# Patient Record
Sex: MALE | Race: WHITE | NOT HISPANIC OR LATINO | Employment: FULL TIME | URBAN - METROPOLITAN AREA
[De-identification: names, ages, dates, MRNs, and addresses within clinical notes are randomized per-mention and may not be internally consistent; named-entity substitution may affect disease eponyms.]

---

## 2017-05-06 ENCOUNTER — OFFICE VISIT (OUTPATIENT)
Dept: URGENT CARE | Facility: CLINIC | Age: 32
End: 2017-05-06

## 2017-05-06 VITALS
RESPIRATION RATE: 16 BRPM | WEIGHT: 149 LBS | BODY MASS INDEX: 20.79 KG/M2 | SYSTOLIC BLOOD PRESSURE: 150 MMHG | TEMPERATURE: 98.1 F | DIASTOLIC BLOOD PRESSURE: 80 MMHG | OXYGEN SATURATION: 97 % | HEART RATE: 80 BPM

## 2017-05-06 DIAGNOSIS — S01.01XA LACERATION OF SCALP WITHOUT FOREIGN BODY, INITIAL ENCOUNTER: ICD-10-CM

## 2017-05-06 PROCEDURE — 12001 RPR S/N/AX/GEN/TRNK 2.5CM/<: CPT | Performed by: PHYSICIAN ASSISTANT

## 2017-05-06 RX ORDER — CLINDAMYCIN HYDROCHLORIDE 300 MG/1
300 CAPSULE ORAL 3 TIMES DAILY
Qty: 15 CAP | Refills: 0 | Status: SHIPPED | OUTPATIENT
Start: 2017-05-06 | End: 2017-05-11

## 2017-05-06 ASSESSMENT — ENCOUNTER SYMPTOMS
CONSTITUTIONAL NEGATIVE: 1
NEUROLOGICAL NEGATIVE: 1

## 2017-05-06 NOTE — PROGRESS NOTES
Subjective:      Spencer Ruby is a 31 y.o. male who presents with Laceration            Laceration   The incident occurred 12 to 24 hours ago (L eyebrow area; superglued last night). The laceration is located on the left eye. The laceration is 2 cm in size. The laceration mechanism was a blunt object. The patient is experiencing no pain. The pain has been constant since onset. He reports no foreign bodies present. His tetanus status is unknown.       Review of Systems   Constitutional: Negative.    HENT: Negative.    Skin: Negative.    Neurological: Negative.           Objective:     /80 mmHg  Pulse 80  Temp(Src) 36.7 °C (98.1 °F)  Resp 16  Wt 67.586 kg (149 lb)  SpO2 97%     Physical Exam   Constitutional: He is oriented to person, place, and time. He appears well-developed and well-nourished. No distress.   HENT:   Head: Normocephalic.   L lat eyebrow area lac, stellate,   Proc= removed superglue, scrubbed w/betadine, irrig w/saline, steri strips w/good closure. rw   Eyes: EOM are normal. Pupils are equal, round, and reactive to light.   Neurological: He is alert and oriented to person, place, and time. No cranial nerve deficit. He exhibits normal muscle tone. Coordination normal.   Skin: Skin is warm and dry.   Psychiatric: He has a normal mood and affect. His behavior is normal. Judgment and thought content normal.   Nursing note and vitals reviewed.    Filed Vitals:    05/06/17 1026   BP: 150/80   Pulse: 80   Temp: 36.7 °C (98.1 °F)   Resp: 16   Weight: 67.586 kg (149 lb)   SpO2: 97%     Active Ambulatory Problems     Diagnosis Date Noted   • No Active Ambulatory Problems     Resolved Ambulatory Problems     Diagnosis Date Noted   • No Resolved Ambulatory Problems     Past Medical History   Diagnosis Date   • Asthma    • Chronic diarrhea      No current outpatient prescriptions on file prior to visit.     No current facility-administered medications on file prior to visit.     Gargles, Cepacol  lozenges, Aleve/Advil as needed for throat pain  Family History   Problem Relation Age of Onset   • Heart Disease Maternal Grandmother      Review of patient's allergies indicates no known allergies.              Assessment/Plan:     ·  L head lac      ·

## 2017-05-06 NOTE — MR AVS SNAPSHOT
Spencer Ruby   2017 10:15 AM   Office Visit   MRN: [id number]    Department:  [facility]   Dept Phone:  [phone]    Description:  Male : 1985   Provider:  Gurpreet Griffiths PA-C           Reason for Visit     Laceration x last night, Laceration on Lt. Eyebrow      Allergies as of 2017     No Known Allergies      Vital Signs     Blood Pressure Pulse Temperature Respirations Weight Oxygen Saturation    150/80 mmHg 80 36.7 °C (98.1 °F) 16 67.586 kg (149 lb) 97%    Smoking Status                   Current Every Day Smoker           Basic Information     Date Of Birth Sex Race Ethnicity Preferred Language    1985 Male White Non- English      Health Maintenance        Date Due Completion Dates    IMM DTaP/Tdap/Td Vaccine (1 - Tdap) 2004 ---            Current Immunizations     No immunizations on file.      Below and/or attached are the medications your provider expects you to take. Review all of your home medications and newly ordered medications with your provider and/or pharmacist. Follow medication instructions as directed by your provider and/or pharmacist. Please keep your medication list with you and share with your provider. Update the information when medications are discontinued, doses are changed, or new medications (including over-the-counter products) are added; and carry medication information at all times in the event of emergency situations     Allergies:  No Known Allergies          Medications  Valid as of: May 06, 2017 - 11:51 AM    Generic Name Brand Name Tablet Size Instructions for use    .                 Medicines prescribed today were sent to:     [id number] - [place] - [address]    [address] [address]    Phone: [phone] Fax: [phone]    Open 24 Hours?: No      Medication refill instructions:       If your prescription bottle indicates you have medication refills left, it is not necessary to call your  provider’s office. Please contact your pharmacy and they will refill your medication.    If your prescription bottle indicates you do not have any refills left, you may request refills at any time through one of the following ways: The online MetaCure system (except Urgent Care), by calling your provider’s office, or by asking your pharmacy to contact your provider’s office with a refill request. Medication refills are processed only during regular business hours and may not be available until the next business day. Your provider may request additional information or to have a follow-up visit with you prior to refilling your medication.   *Please Note: Medication refills are assigned a new Rx number when refilled electronically. Your pharmacy may indicate that no refills were authorized even though a new prescription for the same medication is available at the pharmacy. Please request the medicine by name with the pharmacy before contacting your provider for a refill.           MetaCure Access Code: [email or web address]  Expires: 6/5/2017 11:51 AM    MetaCure  A secure, online tool to manage your health information     [employer]’s MetaCure® is a secure, online tool that connects you to your personalized health information from the privacy of your home -- day or night - making it very easy for you to manage your healthcare. Once the activation process is completed, you can even access your medical information using the MetaCure jose luis, which is available for free in the Apple Jose Luis store or Google Play store.     MetaCure provides the following levels of access (as shown below):   My Chart Features   [facility] Doctor [facility]  Specialists [facility]  Urgent  Care Non-[facility]  Primary Care  Doctor   Email your healthcare team securely and privately 24/7 X X X    Manage appointments: schedule your next appointment; view details of past/upcoming appointments X      Request prescription refills. X      View recent personal  medical records, including lab and immunizations X X X X   View health record, including health history, allergies, medications X X X X   Read reports about your outpatient visits, procedures, consult and ER notes X X X X   See your discharge summary, which is a recap of your hospital and/or ER visit that includes your diagnosis, lab results, and care plan. X X       How to register for Bundle:  1. Go to  [email or web address].  2. Click on the Sign Up Now box, which takes you to the New Member Sign Up page. You will need to provide the following information:  a. Enter your Bundle Access Code exactly as it appears at the top of this page. (You will not need to use this code after you’ve completed the sign-up process. If you do not sign up before the expiration date, you must request a new code.)   b. Enter your date of birth.   c. Enter your home email address.   d. Click Submit, and follow the next screen’s instructions.  3. Create a Bundle ID. This will be your Bundle login ID and cannot be changed, so think of one that is secure and easy to remember.  4. Create a Bundle password. You can change your password at any time.  5. Enter your Password Reset Question and Answer. This can be used at a later time if you forget your password.   6. Enter your e-mail address. This allows you to receive e-mail notifications when new information is available in Bundle.  7. Click Sign Up. You can now view your health information.    For assistance activating your Bundle account, call [phone]        Quit Tobacco Information     Do you want to quit using tobacco?    Quitting tobacco decreases risks of cancer, heart and lung disease, increases life expectancy, improves sense of taste and smell, and increases spending money, among other benefits.    If you are thinking about quitting, we can help.  • [facility] Quit Tobacco Program: [phone]  o Program occurs weekly for four weeks and includes pharmacist  consultation on products to support quitting smoking or chewing tobacco. A provider referral is needed for pharmacist consultation.  • Tobacco Users Help Hotline: 8-800QUIT-NOW (339-1227) or https://[place].quitlogix.org/  o Free, confidential telephone and online coaching for [place] residents. Sessions are designed on a schedule that is convenient for you. Eligible clients receive free nicotine replacement therapy.  • Nationally: www.smokefree.gov  o Information and professional assistance to support both immediate and long-term needs as you become, and remain, a non-smoker. Smokefree.gov allows you to choose the help that best fits your needs.

## 2018-11-28 ENCOUNTER — APPOINTMENT (OUTPATIENT)
Dept: RADIOLOGY | Facility: MEDICAL CENTER | Age: 33
End: 2018-11-28
Attending: EMERGENCY MEDICINE

## 2018-11-28 ENCOUNTER — HOSPITAL ENCOUNTER (EMERGENCY)
Facility: MEDICAL CENTER | Age: 33
End: 2018-11-28
Attending: EMERGENCY MEDICINE

## 2018-11-28 VITALS
WEIGHT: 154.32 LBS | TEMPERATURE: 98.1 F | HEIGHT: 72 IN | HEART RATE: 70 BPM | BODY MASS INDEX: 20.9 KG/M2 | OXYGEN SATURATION: 99 % | DIASTOLIC BLOOD PRESSURE: 84 MMHG | SYSTOLIC BLOOD PRESSURE: 142 MMHG | RESPIRATION RATE: 18 BRPM

## 2018-11-28 DIAGNOSIS — S66.802A INJURY OF HAND, FLEXOR TENDON, LEFT, INITIAL ENCOUNTER: ICD-10-CM

## 2018-11-28 DIAGNOSIS — S64.40XA INJURY OF DIGITAL NERVE OF FINGER, INITIAL ENCOUNTER: ICD-10-CM

## 2018-11-28 DIAGNOSIS — S61.211A LACERATION OF LEFT INDEX FINGER WITHOUT FOREIGN BODY WITHOUT DAMAGE TO NAIL, INITIAL ENCOUNTER: ICD-10-CM

## 2018-11-28 PROCEDURE — 73130 X-RAY EXAM OF HAND: CPT | Mod: LT

## 2018-11-28 PROCEDURE — 303747 HCHG EXTRA SUTURE

## 2018-11-28 PROCEDURE — 304999 HCHG REPAIR-SIMPLE/INTERMED LEVEL 1

## 2018-11-28 PROCEDURE — 700111 HCHG RX REV CODE 636 W/ 250 OVERRIDE (IP): Performed by: EMERGENCY MEDICINE

## 2018-11-28 PROCEDURE — 99284 EMERGENCY DEPT VISIT MOD MDM: CPT

## 2018-11-28 PROCEDURE — 304217 HCHG IRRIGATION SYSTEM

## 2018-11-28 PROCEDURE — 96372 THER/PROPH/DIAG INJ SC/IM: CPT

## 2018-11-28 PROCEDURE — A6403 STERILE GAUZE>16 <= 48 SQ IN: HCPCS

## 2018-11-28 RX ORDER — CEPHALEXIN 500 MG/1
500 CAPSULE ORAL 4 TIMES DAILY
Qty: 28 CAP | Refills: 0 | Status: SHIPPED | OUTPATIENT
Start: 2018-11-28 | End: 2018-12-05

## 2018-11-28 RX ORDER — CEPHALEXIN 250 MG/1
500 CAPSULE ORAL ONCE
Status: DISCONTINUED | OUTPATIENT
Start: 2018-11-28 | End: 2018-11-28

## 2018-11-28 RX ORDER — CEFAZOLIN SODIUM 1 G/3ML
1 INJECTION, POWDER, FOR SOLUTION INTRAMUSCULAR; INTRAVENOUS ONCE
Status: COMPLETED | OUTPATIENT
Start: 2018-11-28 | End: 2018-11-28

## 2018-11-28 RX ADMIN — CEFAZOLIN 1 G: 1 INJECTION, POWDER, FOR SOLUTION INTRAMUSCULAR; INTRAVENOUS at 11:30

## 2018-11-28 ASSESSMENT — PAIN SCALES - GENERAL: PAINLEVEL_OUTOF10: 3

## 2018-11-28 NOTE — ED NOTES
ERP at bedside. Pt agrees with plan of care discussed by ERP. AIDET acknowledged with patient. Matt in low position, side rail up for pt safety. Call light within reach. Will continue to monitor.

## 2018-11-28 NOTE — DISCHARGE INSTRUCTIONS
Keep wound clean and dry.  Watch for signs of infection.  Return for pain swelling redness fevers or difficulty moving her finger.  Follow-up with Dr. Cobb.  He had injuries to your finger nerve and your flexor tendon.  Follow-up with Dr. Cobb.  Return to the emergency room for suture removal or see her doctor for suture removal in about 10 days.

## 2018-11-28 NOTE — ED PROVIDER NOTES
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  Patient was working with a skill saw to cut back and he sustained a laceration to his left index finger.  Tetanus is up-to-date.  Complains of numbness distal to the laceration over the radial aspect of the finger.  Denies any other injuries or complaints.  No problems with movement.    REVIEW OF SYSTEMS  See HPI for further details.  Musculoskeletal: No other musculoskeletal injuries or complaints.    PAST MEDICAL HISTORY  Past Medical History:   Diagnosis Date   • Asthma    • Chronic diarrhea        FAMILY HISTORY  Family History   Problem Relation Age of Onset   • Heart Disease Maternal Grandmother        SOCIAL HISTORY  Social History     Social History   • Marital status: Single     Spouse name: N/A   • Number of children: N/A   • Years of education: N/A     Social History Main Topics   • Smoking status: Current Every Day Smoker     Packs/day: 0.50     Years: 20.00     Types: Cigarettes   • Smokeless tobacco: Never Used   • Alcohol use Yes      Comment: Occasionally   • Drug use: No      Comment: marajuana   • Sexual activity: Not on file     Other Topics Concern   • Not on file     Social History Narrative   • No narrative on file       SURGICAL HISTORY  Past Surgical History:   Procedure Laterality Date   • PB REMOVAL OF TONSILS,<13 Y/O         CURRENT MEDICATIONS  Home Medications    **Home medications have not yet been reviewed for this encounter**         ALLERGIES  No Known Allergies    PHYSICAL EXAM  VITAL SIGNS: /90   Pulse 78   Temp 36.7 °C (98 °F) (Temporal)   Resp 18   Ht 1.829 m (6')   Wt 70 kg (154 lb 5.2 oz)   SpO2 100%   BMI 20.93 kg/m²      Constitutional: Well developed, Well nourished, No acute distress, Non-toxic appearance.   HENT:  Normocephalic, Atraumatic,  Muscular skeletal: Left index finger has a laceration    He has decreased sensation on the left index finger on the radial aspect and essentially no two-point discrimination.  Ulnar aspect has intact two-point discrimination.  FDS and FDP appear intact.  No bony tenderness or deformity.  Immediate cap refill  Neurologic: Alert, No focal deficits noted.   Psychiatric: Affect normal,          RADIOLOGY/PROCEDURES  DX-HAND 3+ LEFT   Final Result      1.  There is no acute fracture.   2.  There is focal soft tissue injury of the left hand 2nd digit.        Laceration percent repair note.      Left index finger was anesthetized with 1% lidocaine with epinephrine a total of 6 cc.  Good anesthesia was obtained.  This is copiously irrigated by the nursing staff.  The finger is then tourniquet and examined in a bloodless field.  There is definitely partial tendon injury to it looks like the FDS tendon.  It does not appear to be complete.  He still has intact motor sensation grossly no foreign body.  The wound is then reapproximated with 6 sutures.  This is done with 4-0 nylon.  Procedure tolerated well.    Laceration length is 2.5 cm total sutures 6.    COURSE & MEDICAL DECISION MAKING  Pertinent Labs & Imaging studies reviewed. (See chart for details).    Patient presents emergency department with left index finger laceration.  The patient has a large laceration that involves a partial injury to the FDS tendon into the digital nerve.    Wound is clean and closed as above.  Was given IM Ancef.  Tetanus is up-to-date.    After closed when I spoke with Dr. Cobb on-call for hand surgery.  She came to see and evaluate the patient and patient did not want further care did not no repair or expiration of her standing wants to be discharged home and follow-up only if there are complications.    We discussed this with him.  He is referred to Dr. Trent if he has any problems.  He will return to  emergency room for more pain swelling redness fevers or other concerns.  He is given return precautions and instructions.  He is aware of the tendon injury and the nerve injury started on antibiotics and he will follow-up as indicated.  Questions were answered is agreed with the plan.      The patient was noted to have elevated blood pressure while in the ER and was counseled to see their doctor within one wee to have this rechecked.      Patient understands discharge duration return precautions.  He will return for signs of infection.  He is discharged in good condition.    FINAL IMPRESSION  1. Laceration of left index finger without foreign body without damage to nail, initial encounter    2. Injury of hand, flexor tendon, left, initial encounter    3. Injury of digital nerve of finger, initial encounter        2.   3.         Electronically signed by: Ernie Mancuso, 11/28/2018 9:19 AM

## 2018-11-28 NOTE — ED NOTES
Prescriptions provided and discussed with patient, pt verbalized understanding. Discharge instructions provided.  Pt verbalized the understanding of discharge instructions to follow up with PCP and to return to ER if condition worsens.  Pt ambulated out of ER without difficulty.

## 2018-12-08 ENCOUNTER — HOSPITAL ENCOUNTER (EMERGENCY)
Facility: MEDICAL CENTER | Age: 33
End: 2018-12-08

## 2018-12-08 VITALS
OXYGEN SATURATION: 99 % | BODY MASS INDEX: 20.9 KG/M2 | TEMPERATURE: 98.2 F | WEIGHT: 154.32 LBS | DIASTOLIC BLOOD PRESSURE: 89 MMHG | HEART RATE: 66 BPM | RESPIRATION RATE: 18 BRPM | HEIGHT: 72 IN | SYSTOLIC BLOOD PRESSURE: 137 MMHG

## 2018-12-08 PROCEDURE — 99281 EMR DPT VST MAYX REQ PHY/QHP: CPT

## 2018-12-08 ASSESSMENT — PAIN SCALES - GENERAL: PAINLEVEL_OUTOF10: 0

## 2018-12-08 NOTE — ED NOTES
6 sutures were removed from pt's left index.  Wound appears well approximated with no signs of infection.  Wound care was provided, and pt was instructed tp return promptly for developing fever, redness, pain, or swelling.

## 2018-12-08 NOTE — ED NOTES
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 He presents today for suture removal.

## 2021-09-07 ENCOUNTER — OFFICE VISIT (OUTPATIENT)
Dept: URGENT CARE | Facility: CLINIC | Age: 36
End: 2021-09-07
Payer: COMMERCIAL

## 2021-09-07 VITALS
RESPIRATION RATE: 14 BRPM | DIASTOLIC BLOOD PRESSURE: 90 MMHG | TEMPERATURE: 97.3 F | HEART RATE: 57 BPM | OXYGEN SATURATION: 100 % | SYSTOLIC BLOOD PRESSURE: 128 MMHG | WEIGHT: 150 LBS | BODY MASS INDEX: 20.32 KG/M2 | HEIGHT: 72 IN

## 2021-09-07 DIAGNOSIS — M62.830 MUSCLE SPASM OF BACK: ICD-10-CM

## 2021-09-07 DIAGNOSIS — M54.6 ACUTE RIGHT-SIDED THORACIC BACK PAIN: ICD-10-CM

## 2021-09-07 PROCEDURE — 99203 OFFICE O/P NEW LOW 30 MIN: CPT | Performed by: NURSE PRACTITIONER

## 2021-09-07 RX ORDER — NAPROXEN 500 MG/1
500 TABLET ORAL
Qty: 40 TABLET | Refills: 0 | Status: SHIPPED
Start: 2021-09-07 | End: 2022-04-12

## 2021-09-07 RX ORDER — KETOROLAC TROMETHAMINE 30 MG/ML
30 INJECTION, SOLUTION INTRAMUSCULAR; INTRAVENOUS ONCE
Status: COMPLETED | OUTPATIENT
Start: 2021-09-07 | End: 2021-09-07

## 2021-09-07 RX ORDER — CYCLOBENZAPRINE HCL 5 MG
5-10 TABLET ORAL 3 TIMES DAILY PRN
Qty: 30 TABLET | Refills: 0 | Status: SHIPPED
Start: 2021-09-07 | End: 2022-04-12

## 2021-09-07 RX ADMIN — KETOROLAC TROMETHAMINE 30 MG: 30 INJECTION, SOLUTION INTRAMUSCULAR; INTRAVENOUS at 10:43

## 2021-09-07 ASSESSMENT — ENCOUNTER SYMPTOMS
COUGH: 0
HEADACHES: 0
FALLS: 0
NAUSEA: 0
FEVER: 0
BACK PAIN: 1
SENSORY CHANGE: 0

## 2021-09-07 ASSESSMENT — LIFESTYLE VARIABLES: SUBSTANCE_ABUSE: 0

## 2021-09-07 NOTE — LETTER
September 7, 2021       Patient: Spencer Ruby   YOB: 1985   Date of Visit: 9/7/2021         To Whom It May Concern:    In my medical opinion, I recommend that Spencer Ruby return to full duty, no restrictions on 09/09/21              Sincerely,          MARCELLA Stoner.  Electronically Signed

## 2021-09-07 NOTE — PROGRESS NOTES
Spencer Ruby is a 35 y.o. male who presents for Back Injury (x2 months, lower back ) and Rib Pain (x this morning, pain towards shoulder blade area, trouble breathing )      HPI this new problem.  Spencer is 35-year-old male patient with complaint of severe thoracic back and rib discomfort.  He Bent over this morning when pain started suddenly.  He has had low back pain x2 months ago in lower back. When he leaned over to  his belt he suddenly felt severe pain in the right side of his upper back. Everything in his low back stopped hurting and pain transferred to his ribs in the right side and thoracic. This is making it hard to breath. Treatment tried: none.  No numbness or tingling. No change GI/ .   He is accompanied by his father today    Review of Systems   Constitutional: Negative for fever.   Respiratory: Negative for cough.    Cardiovascular: Negative for chest pain.   Gastrointestinal: Negative for nausea.   Musculoskeletal: Positive for back pain. Negative for falls.   Neurological: Negative for sensory change and headaches.   Endo/Heme/Allergies: Negative for environmental allergies.   Psychiatric/Behavioral: Negative for substance abuse.       Allergies:     No Known Allergies    PMSFS Hx:  Past Medical History:   Diagnosis Date   • Asthma    • Chronic diarrhea      Past Surgical History:   Procedure Laterality Date   • PB REMOVAL OF TONSILS,<11 Y/O       Family History   Problem Relation Age of Onset   • Heart Disease Maternal Grandmother      Social History     Tobacco Use   • Smoking status: Current Every Day Smoker     Packs/day: 0.50     Years: 20.00     Pack years: 10.00     Types: Cigarettes   • Smokeless tobacco: Never Used   Substance Use Topics   • Alcohol use: Yes     Comment: Occasionally       Problems:   There is no problem list on file for this patient.      Medications:   No current outpatient medications on file prior to visit.     No current facility-administered medications on file  prior to visit.          Objective:     /90   Pulse (!) 57   Temp 36.3 °C (97.3 °F) (Temporal)   Resp 14   Ht 1.829 m (6')   Wt 68 kg (150 lb)   SpO2 100%   BMI 20.34 kg/m²     Physical Exam  Vitals reviewed.   Constitutional:       General: He is not in acute distress.     Appearance: Normal appearance. He is normal weight.   HENT:      Head: Normocephalic.   Cardiovascular:      Rate and Rhythm: Normal rate.      Pulses: Normal pulses.   Pulmonary:      Effort: Pulmonary effort is normal. No respiratory distress.      Breath sounds: Normal breath sounds.   Chest:      Chest wall: No tenderness.   Abdominal:      Palpations: Abdomen is soft.   Musculoskeletal:      Thoracic back: Normal.        Back:    Skin:     General: Skin is warm.   Neurological:      Mental Status: He is alert and oriented to person, place, and time.      Sensory: Sensation is intact.      Motor: Motor function is intact.      Coordination: Coordination is intact.      Gait: Gait is intact.      Deep Tendon Reflexes:      Reflex Scores:       Brachioradialis reflexes are 2+ on the right side and 2+ on the left side.       Patellar reflexes are 2+ on the right side and 2+ on the left side.  Psychiatric:         Mood and Affect: Mood normal.         Behavior: Behavior normal.         Thought Content: Thought content normal.         Assessment /Associated Orders:      1. Acute right-sided thoracic back pain  ketorolac (TORADOL) injection 30 mg    cyclobenzaprine (FLEXERIL) 5 mg tablet    naproxen (NAPROSYN) 500 MG Tab   2. Muscle spasm of back         Medical Decision Making:    Pt is clinically stable at today's acute urgent care visit.  No acute distress noted. Appropriate for outpatient management at this time.   Acute problem today with uncertain prognosis.   Ice/ heat  Toradol given in clinic today. Tolerated well without adverse effects. Advised not to take NSAIDS for 6-8 hours post injection.   Educated in proper  administration of medication(s) ordered today including safety, possible SE, risks, benefits, rationale and alternatives to therapy.   Educated in sedative effect of muscle relaxant. No ETOH, operating machinery or driving while taking medication. Allow 6-8 hour wear off time.   Do not take additional OTC or RX NSAIDS while taking Naprosyn. Take with food.   Advised to follow-up with the primary care provider for recheck, reevaluation, and consideration of further management if necessary.   Discussed management options (risks,benefits, and alternatives to treatment). Expressed understanding and the treatment plan was agreed upon. Questions were encouraged and answered       Return to urgent care prn if new or worsening sx or if there is no improvement in condition prn.  Educated in Red flags and indications to immediately call 911 or present to the Emergency Department.     I personally reviewed prior external notes and test results pertinent to today's visit.  I have independently reviewed and interpreted all diagnostics ordered during this urgent care acute visit.   Time spent evaluating this patient was at least 30 minutes and includes preparing for visit, counseling/education, exam and evaluation, obtaining history, independent interpretation, ordering lab/test/procedures,medication management and documentation.Time does not include separately billable procedures noted .

## 2022-03-24 ENCOUNTER — TELEPHONE (OUTPATIENT)
Dept: SCHEDULING | Facility: IMAGING CENTER | Age: 37
End: 2022-03-24

## 2022-04-08 NOTE — PROGRESS NOTES
Subjective:     CC:  Diagnoses of Preventative health care, Current smoker, Screening for HIV (human immunodeficiency virus), Need for hepatitis C screening test, Genetic screening, and Encounter to establish care with new doctor were pertinent to this visit.    HISTORY OF THE PRESENT ILLNESS: Patient is a 36 y.o. male. This pleasant patient is here today to establish care and discuss chronic conditions. His prior PCP was none.    Problem   Preventative Health Care    Patient is here to establish care today. Feels well overall. He reports previously healthy and no established PCP in the past.    Discussed and offered the no cost genetic screening through [facility]. Patient is interested in participating.     Current Smoker    Chronic condition. He is a current smoker 1.5 ppd since age 12. He reports he only smokes in garage and never when he is around his children. He is not interested in quitting at this time.         No current Epic-ordered outpatient medications on file.     No current Epic-ordered facility-administered medications on file.     Social history  Living situation: lives with family at home, 3 children, 50/50 custody on children  Occupation: works as superintendent for construction work  Alcohol/tobacco/illicit drugs: current smoker 1.5 ppd since age 12, smokes in garage, 1-2 beers every other day, denies illicit drugs  Diet/Exercise: tries to eat healthy in general, no regular exercise other than work    Health Maintenance: reviewed and discussed with patient  Vaccines: due for PCV (declined), COVID-19 (no plans to), Tdap received 03/2018    ROS:   Gen: no fevers/chills, no changes in weight  ENT: no sore throat  Pulm: no sob, no cough  CV: no chest pain, no palpitations  GI: no nausea/vomiting, no diarrhea  : no dysuria  MSk: no myalgias  Skin: no rash  Neuro: no headaches, no numbness/tingling      Objective:     Exam: /84 (BP Location: Left arm, Patient Position: Sitting, BP  "Cuff Size: Adult)   Pulse 68   Temp 36.9 °C (98.4 °F) (Temporal)   Resp 16   Ht 1.8 m (5' 10.87\")   Wt 66.7 kg (147 lb 0.8 oz)   SpO2 98%  Body mass index is 20.59 kg/m².    General: Normal appearing. No distress.  HEENT: Normocephalic. Nasal mucosa benign, oropharynx is without erythema, edema or exudates.   Neck: Supple without JVD or bruit. Thyroid is not enlarged.  Pulmonary: Clear to ausculation. Normal effort. No rales, ronchi, or wheezing.  Cardiovascular: Regular rate and rhythm without murmur. Carotid and radial pulses are intact and equal bilaterally.  Abdomen: Soft, nontender, nondistended. Normal bowel sounds.  Neurologic: Grossly nonfocal  Skin: Warm and dry. No obvious lesions.  Musculoskeletal: Normal gait. No extremity cyanosis, clubbing, or edema.  Psych: Normal mood and affect. Alert and oriented x3. Judgment and insight is normal.    Labs: No recent lab results available for review at this time    Assessment & Plan:   36 y.o. male with the following -    1. Preventative health care  - recommended age appropriate vaccinations  - follow up in 1 year for annual physical or earlier as needed based on blood test results  - Comp Metabolic Panel; Future  - Lipid Profile; Future  - TSH WITH REFLEX TO FT4; Future  - CBC WITH DIFFERENTIAL; Future  - VITAMIN D,25 HYDROXY; Future    2. Current smoker  - education and brief counseling on tobacco cessation provided (time spent 5 minutes), pt was encouraged to quit smoking, cessation methods/medications were recommended and/or discussed   - pt declines medication/method to assist with smoking cessation   - advised to avoid smoking near his children    3. Screening for HIV (human immunodeficiency virus)  - HIV AG/AB COMBO ASSAY SCREENING; Future    4. Need for hepatitis C screening test  - HEP C VIRUS ANTIBODY; Future    5. Genetic screening  - Referral to Genetic Research Studies    6. Encounter to establish care with new doctor      Return in about 1 year " (around 4/12/2023) for annual physical.    Please note that this dictation was created using voice recognition software. I have made every reasonable attempt to correct obvious errors, but I expect that there are errors of grammar and possibly content that I did not discover before finalizing the note.

## 2022-04-12 ENCOUNTER — OFFICE VISIT (OUTPATIENT)
Dept: MEDICAL GROUP | Facility: MEDICAL CENTER | Age: 37
End: 2022-04-12
Payer: COMMERCIAL

## 2022-04-12 VITALS
HEIGHT: 71 IN | BODY MASS INDEX: 20.59 KG/M2 | TEMPERATURE: 98.4 F | SYSTOLIC BLOOD PRESSURE: 102 MMHG | HEART RATE: 68 BPM | OXYGEN SATURATION: 98 % | RESPIRATION RATE: 16 BRPM | WEIGHT: 147.05 LBS | DIASTOLIC BLOOD PRESSURE: 84 MMHG

## 2022-04-12 DIAGNOSIS — Z11.4 SCREENING FOR HIV (HUMAN IMMUNODEFICIENCY VIRUS): ICD-10-CM

## 2022-04-12 DIAGNOSIS — F17.200 CURRENT SMOKER: ICD-10-CM

## 2022-04-12 DIAGNOSIS — Z11.59 NEED FOR HEPATITIS C SCREENING TEST: ICD-10-CM

## 2022-04-12 DIAGNOSIS — Z76.89 ENCOUNTER TO ESTABLISH CARE WITH NEW DOCTOR: ICD-10-CM

## 2022-04-12 DIAGNOSIS — Z13.79 GENETIC SCREENING: ICD-10-CM

## 2022-04-12 DIAGNOSIS — Z00.00 PREVENTATIVE HEALTH CARE: ICD-10-CM

## 2022-04-12 PROCEDURE — 99395 PREV VISIT EST AGE 18-39: CPT | Mod: 25 | Performed by: STUDENT IN AN ORGANIZED HEALTH CARE EDUCATION/TRAINING PROGRAM

## 2022-04-12 PROCEDURE — 99406 BEHAV CHNG SMOKING 3-10 MIN: CPT | Performed by: STUDENT IN AN ORGANIZED HEALTH CARE EDUCATION/TRAINING PROGRAM

## 2022-04-12 ASSESSMENT — PATIENT HEALTH QUESTIONNAIRE - PHQ9: CLINICAL INTERPRETATION OF PHQ2 SCORE: 0

## 2022-08-31 ENCOUNTER — HOSPITAL ENCOUNTER (OUTPATIENT)
Dept: LAB | Facility: MEDICAL CENTER | Age: 37
End: 2022-08-31
Attending: STUDENT IN AN ORGANIZED HEALTH CARE EDUCATION/TRAINING PROGRAM
Payer: COMMERCIAL

## 2022-08-31 DIAGNOSIS — Z11.4 SCREENING FOR HIV (HUMAN IMMUNODEFICIENCY VIRUS): ICD-10-CM

## 2022-08-31 DIAGNOSIS — Z11.59 NEED FOR HEPATITIS C SCREENING TEST: ICD-10-CM

## 2022-08-31 DIAGNOSIS — Z00.00 PREVENTATIVE HEALTH CARE: ICD-10-CM

## 2022-08-31 LAB
ALBUMIN SERPL BCP-MCNC: 4.9 G/DL (ref 3.2–4.9)
ALBUMIN/GLOB SERPL: 2.1 G/DL
ALP SERPL-CCNC: 59 U/L (ref 30–99)
ALT SERPL-CCNC: 13 U/L (ref 2–50)
ANION GAP SERPL CALC-SCNC: 11 MMOL/L (ref 7–16)
AST SERPL-CCNC: 16 U/L (ref 12–45)
BASOPHILS # BLD AUTO: 0.3 % (ref 0–1.8)
BASOPHILS # BLD: 0.02 K/UL (ref 0–0.12)
BILIRUB SERPL-MCNC: 0.7 MG/DL (ref 0.1–1.5)
BUN SERPL-MCNC: 13 MG/DL (ref 8–22)
CALCIUM SERPL-MCNC: 9.5 MG/DL (ref 8.4–10.2)
CHLORIDE SERPL-SCNC: 106 MMOL/L (ref 96–112)
CHOLEST SERPL-MCNC: 131 MG/DL (ref 100–199)
CO2 SERPL-SCNC: 23 MMOL/L (ref 20–33)
CREAT SERPL-MCNC: 0.89 MG/DL (ref 0.5–1.4)
EOSINOPHIL # BLD AUTO: 0.06 K/UL (ref 0–0.51)
EOSINOPHIL NFR BLD: 0.8 % (ref 0–6.9)
ERYTHROCYTE [DISTWIDTH] IN BLOOD BY AUTOMATED COUNT: 44.6 FL (ref 35.9–50)
GFR SERPLBLD CREATININE-BSD FMLA CKD-EPI: 113 ML/MIN/1.73 M 2
GLOBULIN SER CALC-MCNC: 2.3 G/DL (ref 1.9–3.5)
GLUCOSE SERPL-MCNC: 91 MG/DL (ref 65–99)
HCT VFR BLD AUTO: 47.7 % (ref 42–52)
HDLC SERPL-MCNC: 52 MG/DL
HGB BLD-MCNC: 16.7 G/DL (ref 14–18)
IMM GRANULOCYTES # BLD AUTO: 0.01 K/UL (ref 0–0.11)
IMM GRANULOCYTES NFR BLD AUTO: 0.1 % (ref 0–0.9)
LDLC SERPL CALC-MCNC: 71 MG/DL
LYMPHOCYTES # BLD AUTO: 2.61 K/UL (ref 1–4.8)
LYMPHOCYTES NFR BLD: 36.1 % (ref 22–41)
MCH RBC QN AUTO: 31.7 PG (ref 27–33)
MCHC RBC AUTO-ENTMCNC: 35 G/DL (ref 33.7–35.3)
MCV RBC AUTO: 90.7 FL (ref 81.4–97.8)
MONOCYTES # BLD AUTO: 0.45 K/UL (ref 0–0.85)
MONOCYTES NFR BLD AUTO: 6.2 % (ref 0–13.4)
NEUTROPHILS # BLD AUTO: 4.08 K/UL (ref 1.82–7.42)
NEUTROPHILS NFR BLD: 56.5 % (ref 44–72)
NRBC # BLD AUTO: 0 K/UL
NRBC BLD-RTO: 0 /100 WBC
PLATELET # BLD AUTO: 233 K/UL (ref 164–446)
PMV BLD AUTO: 9.5 FL (ref 9–12.9)
POTASSIUM SERPL-SCNC: 3.9 MMOL/L (ref 3.6–5.5)
PROT SERPL-MCNC: 7.2 G/DL (ref 6–8.2)
RBC # BLD AUTO: 5.26 M/UL (ref 4.7–6.1)
SODIUM SERPL-SCNC: 140 MMOL/L (ref 135–145)
TRIGL SERPL-MCNC: 41 MG/DL (ref 0–149)
TSH SERPL DL<=0.005 MIU/L-ACNC: 0.93 UIU/ML (ref 0.38–5.33)
WBC # BLD AUTO: 7.2 K/UL (ref 4.8–10.8)

## 2022-08-31 PROCEDURE — 36415 COLL VENOUS BLD VENIPUNCTURE: CPT

## 2022-08-31 PROCEDURE — 84443 ASSAY THYROID STIM HORMONE: CPT

## 2022-08-31 PROCEDURE — 80061 LIPID PANEL: CPT

## 2022-08-31 PROCEDURE — 85025 COMPLETE CBC W/AUTO DIFF WBC: CPT

## 2022-08-31 PROCEDURE — 80053 COMPREHEN METABOLIC PANEL: CPT

## 2022-08-31 PROCEDURE — 86803 HEPATITIS C AB TEST: CPT

## 2022-08-31 PROCEDURE — 82306 VITAMIN D 25 HYDROXY: CPT

## 2022-08-31 PROCEDURE — 87389 HIV-1 AG W/HIV-1&-2 AB AG IA: CPT

## 2022-09-01 LAB
25(OH)D3 SERPL-MCNC: 34 NG/ML (ref 30–100)
HCV AB SER QL: NORMAL
HIV 1+2 AB+HIV1 P24 AG SERPL QL IA: NORMAL

## 2022-09-02 ENCOUNTER — OFFICE VISIT (OUTPATIENT)
Dept: MEDICAL GROUP | Facility: MEDICAL CENTER | Age: 37
End: 2022-09-02
Payer: COMMERCIAL

## 2022-09-02 VITALS
BODY MASS INDEX: 19.75 KG/M2 | SYSTOLIC BLOOD PRESSURE: 118 MMHG | RESPIRATION RATE: 16 BRPM | HEIGHT: 71 IN | TEMPERATURE: 97.5 F | HEART RATE: 61 BPM | DIASTOLIC BLOOD PRESSURE: 76 MMHG | OXYGEN SATURATION: 97 % | WEIGHT: 141.09 LBS

## 2022-09-02 DIAGNOSIS — A48.8: ICD-10-CM

## 2022-09-02 DIAGNOSIS — F17.200 CURRENT SMOKER: ICD-10-CM

## 2022-09-02 DIAGNOSIS — Z00.00 PREVENTATIVE HEALTH CARE: ICD-10-CM

## 2022-09-02 PROCEDURE — 99395 PREV VISIT EST AGE 18-39: CPT | Performed by: STUDENT IN AN ORGANIZED HEALTH CARE EDUCATION/TRAINING PROGRAM

## 2022-09-02 RX ORDER — CLINDAMYCIN PHOSPHATE 10 MG/G
GEL TOPICAL
Qty: 30 G | Refills: 2 | Status: SHIPPED | OUTPATIENT
Start: 2022-09-02 | End: 2024-02-13 | Stop reason: SDUPTHER

## 2022-09-02 ASSESSMENT — FIBROSIS 4 INDEX: FIB4 SCORE: 0.69

## 2022-09-02 NOTE — PROGRESS NOTES
Subjective:     CC: annual physical    HPI:   Spencer presents today for annual physical    Problem   Trichomycosis Axillaris    Chronic condition for several years. He has noticed discolored armpit hair and rubberband like with white chalky waxy appearance.     Preventative Health Care    Patient is here for annual physical today. Feels well overall. He reports previously healthy and no established PCP in the past.     Health Maintenance: reviewed and discussed with patient  Vaccines: due for PCV (declined), COVID-19 (no plans to), Tdap received 03/2018     Current Smoker    Chronic condition. He is a current smoker 1.5 ppd since age 12. He reports he only smokes in garage and never when he is around his children. He is not interested in quitting at this time.         Current Outpatient Medications Ordered in Epic   Medication Sig Dispense Refill    clindamycin (CLEOCIN T) 1 % Gel Apply a thin layer to affected area twice daily for up to 4 weeks 30 g 2     No current Epic-ordered facility-administered medications on file.     Social history  Living situation: lives with family at home, 3 children, 50/50 custody on children  Occupation: works as superintendent for construction work  Alcohol/tobacco/illicit drugs: current smoker 1.5 ppd since age 12, smokes in garage, 1-2 beers every other day, denies illicit drugs  Diet/Exercise: tries to eat healthy in general, no regular exercise other than work     Health Maintenance: reviewed and discussed with patient  Vaccines: due for PCV (declined), COVID-19 (no plans to), Tdap received 03/2018     ROS:   Gen: no fevers/chills, no changes in weight  ENT: no sore throat  Pulm: no sob, no cough  CV: no chest pain, no palpitations  GI: no nausea/vomiting, no diarrhea  : no dysuria  MSk: no myalgias  Skin: no rash  Neuro: no headaches, no numbness/tingling    Objective:     Exam:  /76 (BP Location: Left arm, Patient Position: Sitting, BP Cuff Size: Adult)   Pulse 61    "Temp 36.4 °C (97.5 °F) (Temporal)   Resp 16   Ht 1.8 m (5' 10.87\")   Wt 64 kg (141 lb 1.5 oz)   SpO2 97%   BMI 19.75 kg/m²  Body mass index is 19.75 kg/m².    General: Normal appearing. No distress.  HEENT: Normocephalic. Nasal mucosa benign, oropharynx is without erythema, edema or exudates.   Neck: Supple without JVD or bruit. Thyroid is not enlarged.  Pulmonary: Clear to ausculation. Normal effort. No rales, ronchi, or wheezing.  Cardiovascular: Regular rate and rhythm without murmur. Carotid and radial pulses are intact and equal bilaterally.  Abdomen: Soft, nontender, nondistended. Normal bowel sounds.  Neurologic: Grossly nonfocal  Skin: Warm and dry. Bilateral axillary hair with whitish sheath-like structures on the hair shaft.  Musculoskeletal: Normal gait. No extremity cyanosis, clubbing, or edema.  Psych: Normal mood and affect. Alert and oriented x3. Judgment and insight is normal.    Labs:   Lab Results   Component Value Date/Time    SODIUM 140 08/31/2022 04:21 PM    POTASSIUM 3.9 08/31/2022 04:21 PM    CHLORIDE 106 08/31/2022 04:21 PM    CO2 23 08/31/2022 04:21 PM    ANION 11.0 08/31/2022 04:21 PM    GLUCOSE 91 08/31/2022 04:21 PM    BUN 13 08/31/2022 04:21 PM    CREATININE 0.89 08/31/2022 04:21 PM    CALCIUM 9.5 08/31/2022 04:21 PM    ASTSGOT 16 08/31/2022 04:21 PM    ALTSGPT 13 08/31/2022 04:21 PM    TBILIRUBIN 0.7 08/31/2022 04:21 PM    ALBUMIN 4.9 08/31/2022 04:21 PM    TOTPROTEIN 7.2 08/31/2022 04:21 PM    GLOBULIN 2.3 08/31/2022 04:21 PM    AGRATIO 2.1 08/31/2022 04:21 PM     Lab Results   Component Value Date/Time    CHOLSTRLTOT 131 08/31/2022 1621    TRIGLYCERIDE 41 08/31/2022 1621    HDL 52 08/31/2022 1621    LDL 71 08/31/2022 1621     Lab Results   Component Value Date/Time    TSHULTRASEN 0.928 08/31/2022 1621     25-Hydroxy   Vitamin D 25 (ng/mL)   Date Value   08/31/2022 34       Assessment & Plan:     36 y.o. male with the following -     1. Preventative health care  - recommended age " appropriate vaccinations  - follow up 1 year for annual physical    2. Trichomycosis axillaris  Chronic condition, persistent.  - plan to trial clindamycin 1% gel per order  - advised to bathe daily and shave hair in affected area daily for 2-3 weeks  - clindamycin (CLEOCIN T) 1 % Gel; Apply a thin layer to affected area twice daily for up to 4 weeks  Dispense: 30 g; Refill: 2    3. Current smoker  - education and brief counseling on tobacco cessation provided (time spent 5 minutes), pt was encouraged to quit smoking, cessation methods/medications were recommended and/or discussed   - pt declines medication/method to assist with smoking cessation     Return in about 1 year (around 9/2/2023) for annual physical.    Please note that this dictation was created using voice recognition software. I have made every reasonable attempt to correct obvious errors, but I expect that there are errors of grammar and possibly content that I did not discover before finalizing the note.

## 2023-04-12 ENCOUNTER — APPOINTMENT (OUTPATIENT)
Dept: MEDICAL GROUP | Facility: MEDICAL CENTER | Age: 38
End: 2023-04-12
Payer: COMMERCIAL

## 2023-04-12 ENCOUNTER — OFFICE VISIT (OUTPATIENT)
Dept: MEDICAL GROUP | Facility: MEDICAL CENTER | Age: 38
End: 2023-04-12
Payer: COMMERCIAL

## 2023-04-12 VITALS
OXYGEN SATURATION: 98 % | HEART RATE: 77 BPM | TEMPERATURE: 99.3 F | RESPIRATION RATE: 17 BRPM | BODY MASS INDEX: 20.37 KG/M2 | WEIGHT: 145.5 LBS | HEIGHT: 71 IN | SYSTOLIC BLOOD PRESSURE: 112 MMHG | DIASTOLIC BLOOD PRESSURE: 70 MMHG
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
## 2023-04-12 DIAGNOSIS — M54.50 ACUTE BILATERAL LOW BACK PAIN WITHOUT SCIATICA: ICD-10-CM

## 2023-04-12 DIAGNOSIS — N50.82 SCROTAL PAIN: ICD-10-CM

## 2023-04-12 DIAGNOSIS — M25.511 ACUTE PAIN OF RIGHT SHOULDER: ICD-10-CM

## 2023-04-12 PROCEDURE — 99214 OFFICE O/P EST MOD 30 MIN: CPT | Performed by: STUDENT IN AN ORGANIZED HEALTH CARE EDUCATION/TRAINING PROGRAM

## 2023-04-12 RX ORDER — CYCLOBENZAPRINE HCL 10 MG
10 TABLET ORAL 3 TIMES DAILY PRN
Qty: 30 TABLET | Refills: 1 | Status: SHIPPED | OUTPATIENT
Start: 2023-04-12 | End: 2024-02-13

## 2023-04-12 ASSESSMENT — PATIENT HEALTH QUESTIONNAIRE - PHQ9: CLINICAL INTERPRETATION OF PHQ2 SCORE: 0

## 2023-04-12 ASSESSMENT — FIBROSIS 4 INDEX: FIB4 SCORE: 0.7
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
## 2023-09-05 ENCOUNTER — DOCUMENTATION (OUTPATIENT)
Dept: HEALTH INFORMATION MANAGEMENT | Facility: OTHER | Age: 38
End: 2023-09-05

## 2023-09-15 ENCOUNTER — APPOINTMENT (OUTPATIENT)
Dept: MEDICAL GROUP | Facility: MEDICAL CENTER | Age: 38
End: 2023-09-15
Payer: COMMERCIAL

## 2024-02-12 SDOH — ECONOMIC STABILITY: TRANSPORTATION INSECURITY
IN THE PAST 12 MONTHS, HAS LACK OF TRANSPORTATION KEPT YOU FROM MEETINGS, WORK, OR FROM GETTING THINGS NEEDED FOR DAILY LIVING?: NO

## 2024-02-12 SDOH — ECONOMIC STABILITY: INCOME INSECURITY: HOW HARD IS IT FOR YOU TO PAY FOR THE VERY BASICS LIKE FOOD, HOUSING, MEDICAL CARE, AND HEATING?: NOT VERY HARD

## 2024-02-12 SDOH — ECONOMIC STABILITY: INCOME INSECURITY: IN THE LAST 12 MONTHS, WAS THERE A TIME WHEN YOU WERE NOT ABLE TO PAY THE MORTGAGE OR RENT ON TIME?: NO

## 2024-02-12 SDOH — HEALTH STABILITY: PHYSICAL HEALTH: ON AVERAGE, HOW MANY MINUTES DO YOU ENGAGE IN EXERCISE AT THIS LEVEL?: 150+ MIN

## 2024-02-12 SDOH — ECONOMIC STABILITY: HOUSING INSECURITY
IN THE LAST 12 MONTHS, WAS THERE A TIME WHEN YOU DID NOT HAVE A STEADY PLACE TO SLEEP OR SLEPT IN A SHELTER (INCLUDING NOW)?: NO

## 2024-02-12 SDOH — ECONOMIC STABILITY: HOUSING INSECURITY: IN THE LAST 12 MONTHS, HOW MANY PLACES HAVE YOU LIVED?: 1

## 2024-02-12 SDOH — ECONOMIC STABILITY: FOOD INSECURITY: WITHIN THE PAST 12 MONTHS, THE FOOD YOU BOUGHT JUST DIDN'T LAST AND YOU DIDN'T HAVE MONEY TO GET MORE.: NEVER TRUE

## 2024-02-12 SDOH — ECONOMIC STABILITY: FOOD INSECURITY: WITHIN THE PAST 12 MONTHS, YOU WORRIED THAT YOUR FOOD WOULD RUN OUT BEFORE YOU GOT MONEY TO BUY MORE.: NEVER TRUE

## 2024-02-12 SDOH — ECONOMIC STABILITY: TRANSPORTATION INSECURITY
IN THE PAST 12 MONTHS, HAS THE LACK OF TRANSPORTATION KEPT YOU FROM MEDICAL APPOINTMENTS OR FROM GETTING MEDICATIONS?: NO

## 2024-02-12 SDOH — ECONOMIC STABILITY: TRANSPORTATION INSECURITY
IN THE PAST 12 MONTHS, HAS LACK OF RELIABLE TRANSPORTATION KEPT YOU FROM MEDICAL APPOINTMENTS, MEETINGS, WORK OR FROM GETTING THINGS NEEDED FOR DAILY LIVING?: NO

## 2024-02-12 SDOH — HEALTH STABILITY: PHYSICAL HEALTH: ON AVERAGE, HOW MANY DAYS PER WEEK DO YOU ENGAGE IN MODERATE TO STRENUOUS EXERCISE (LIKE A BRISK WALK)?: 6 DAYS

## 2024-02-12 SDOH — HEALTH STABILITY: MENTAL HEALTH
STRESS IS WHEN SOMEONE FEELS TENSE, NERVOUS, ANXIOUS, OR CAN'T SLEEP AT NIGHT BECAUSE THEIR MIND IS TROUBLED. HOW STRESSED ARE YOU?: ONLY A LITTLE

## 2024-02-12 ASSESSMENT — SOCIAL DETERMINANTS OF HEALTH (SDOH)
IN A TYPICAL WEEK, HOW MANY TIMES DO YOU TALK ON THE PHONE WITH FAMILY, FRIENDS, OR NEIGHBORS?: MORE THAN THREE TIMES A WEEK
ARE YOU MARRIED, WIDOWED, DIVORCED, SEPARATED, NEVER MARRIED, OR LIVING WITH A PARTNER?: NEVER MARRIED
HOW OFTEN DO YOU HAVE A DRINK CONTAINING ALCOHOL: 4 OR MORE TIMES A WEEK
ARE YOU MARRIED, WIDOWED, DIVORCED, SEPARATED, NEVER MARRIED, OR LIVING WITH A PARTNER?: NEVER MARRIED
WITHIN THE PAST 12 MONTHS, YOU WORRIED THAT YOUR FOOD WOULD RUN OUT BEFORE YOU GOT THE MONEY TO BUY MORE: NEVER TRUE
HOW HARD IS IT FOR YOU TO PAY FOR THE VERY BASICS LIKE FOOD, HOUSING, MEDICAL CARE, AND HEATING?: NOT VERY HARD
DO YOU BELONG TO ANY CLUBS OR ORGANIZATIONS SUCH AS CHURCH GROUPS UNIONS, FRATERNAL OR ATHLETIC GROUPS, OR SCHOOL GROUPS?: NO
HOW MANY DRINKS CONTAINING ALCOHOL DO YOU HAVE ON A TYPICAL DAY WHEN YOU ARE DRINKING: 1 OR 2
HOW OFTEN DO YOU ATTENT MEETINGS OF THE CLUB OR ORGANIZATION YOU BELONG TO?: NEVER
HOW OFTEN DO YOU HAVE SIX OR MORE DRINKS ON ONE OCCASION: LESS THAN MONTHLY
HOW OFTEN DO YOU ATTENT MEETINGS OF THE CLUB OR ORGANIZATION YOU BELONG TO?: NEVER
DO YOU BELONG TO ANY CLUBS OR ORGANIZATIONS SUCH AS CHURCH GROUPS UNIONS, FRATERNAL OR ATHLETIC GROUPS, OR SCHOOL GROUPS?: NO
HOW OFTEN DO YOU GET TOGETHER WITH FRIENDS OR RELATIVES?: TWICE A WEEK
IN A TYPICAL WEEK, HOW MANY TIMES DO YOU TALK ON THE PHONE WITH FAMILY, FRIENDS, OR NEIGHBORS?: MORE THAN THREE TIMES A WEEK
HOW OFTEN DO YOU GET TOGETHER WITH FRIENDS OR RELATIVES?: TWICE A WEEK
HOW OFTEN DO YOU ATTEND CHURCH OR RELIGIOUS SERVICES?: NEVER
HOW OFTEN DO YOU ATTEND CHURCH OR RELIGIOUS SERVICES?: NEVER

## 2024-02-12 ASSESSMENT — LIFESTYLE VARIABLES
SKIP TO QUESTIONS 9-10: 0
HOW OFTEN DO YOU HAVE A DRINK CONTAINING ALCOHOL: 4 OR MORE TIMES A WEEK
AUDIT-C TOTAL SCORE: 5
HOW MANY STANDARD DRINKS CONTAINING ALCOHOL DO YOU HAVE ON A TYPICAL DAY: 1 OR 2
HOW OFTEN DO YOU HAVE SIX OR MORE DRINKS ON ONE OCCASION: LESS THAN MONTHLY

## 2024-02-13 ENCOUNTER — OFFICE VISIT (OUTPATIENT)
Dept: MEDICAL GROUP | Facility: LAB | Age: 39
End: 2024-02-13
Payer: COMMERCIAL

## 2024-02-13 VITALS
BODY MASS INDEX: 21.5 KG/M2 | OXYGEN SATURATION: 98 % | HEART RATE: 78 BPM | SYSTOLIC BLOOD PRESSURE: 126 MMHG | DIASTOLIC BLOOD PRESSURE: 62 MMHG | HEIGHT: 71 IN | WEIGHT: 153.6 LBS | TEMPERATURE: 98.3 F | RESPIRATION RATE: 16 BRPM

## 2024-02-13 DIAGNOSIS — R25.2 CRAMPING OF FEET: ICD-10-CM

## 2024-02-13 DIAGNOSIS — Z00.00 ANNUAL PHYSICAL EXAM: ICD-10-CM

## 2024-02-13 DIAGNOSIS — Z11.3 SCREENING EXAMINATION FOR STD (SEXUALLY TRANSMITTED DISEASE): ICD-10-CM

## 2024-02-13 DIAGNOSIS — F17.200 CURRENT SMOKER: ICD-10-CM

## 2024-02-13 DIAGNOSIS — R25.2 CRAMPING OF HANDS: ICD-10-CM

## 2024-02-13 DIAGNOSIS — A48.8: ICD-10-CM

## 2024-02-13 PROBLEM — N50.82 SCROTAL PAIN: Status: RESOLVED | Noted: 2023-04-12 | Resolved: 2024-02-13

## 2024-02-13 PROBLEM — M54.50 ACUTE BILATERAL LOW BACK PAIN WITHOUT SCIATICA: Status: RESOLVED | Noted: 2023-04-12 | Resolved: 2024-02-13
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
## 2024-02-13 PROBLEM — M25.511 ACUTE PAIN OF RIGHT SHOULDER: Status: RESOLVED | Noted: 2023-04-12 | Resolved: 2024-02-13

## 2024-02-13 PROCEDURE — 3078F DIAST BP <80 MM HG: CPT | Performed by: STUDENT IN AN ORGANIZED HEALTH CARE EDUCATION/TRAINING PROGRAM

## 2024-02-13 PROCEDURE — 3074F SYST BP LT 130 MM HG: CPT | Performed by: STUDENT IN AN ORGANIZED HEALTH CARE EDUCATION/TRAINING PROGRAM

## 2024-02-13 PROCEDURE — 99406 BEHAV CHNG SMOKING 3-10 MIN: CPT | Performed by: STUDENT IN AN ORGANIZED HEALTH CARE EDUCATION/TRAINING PROGRAM

## 2024-02-13 PROCEDURE — 99204 OFFICE O/P NEW MOD 45 MIN: CPT | Mod: 25 | Performed by: STUDENT IN AN ORGANIZED HEALTH CARE EDUCATION/TRAINING PROGRAM

## 2024-02-13 RX ORDER — CLINDAMYCIN PHOSPHATE 10 MG/G
GEL TOPICAL
Qty: 30 G | Refills: 2 | Status: SHIPPED | OUTPATIENT
Start: 2024-02-13

## 2024-02-13 ASSESSMENT — PATIENT HEALTH QUESTIONNAIRE - PHQ9: CLINICAL INTERPRETATION OF PHQ2 SCORE: 0

## 2024-02-13 ASSESSMENT — ENCOUNTER SYMPTOMS
FEVER: 0
SHORTNESS OF BREATH: 0
CHILLS: 0

## 2024-02-13 ASSESSMENT — FIBROSIS 4 INDEX: FIB4 SCORE: 0.72
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
## 2024-02-14 NOTE — ASSESSMENT & PLAN NOTE
Chronic-not adequately controlled   -recommended anti-fungal powder   -refill clindamycin gel  -refer to dermatology   - I did discuss with him that smoking may be negatively contributing to his condition